# Patient Record
Sex: FEMALE | Race: WHITE
[De-identification: names, ages, dates, MRNs, and addresses within clinical notes are randomized per-mention and may not be internally consistent; named-entity substitution may affect disease eponyms.]

---

## 2017-03-07 NOTE — HISTORY AND PHYSICAL REPORT
March 6, 2017



CHIEF COMPLAINT: I saw Ms. Alexandre in the office on 03/06/17 in regards to her 
abdominal pain. Ms. Alexandre is a 51-year-old female who states gets ongoing right 
subcostal postprandial pain. She does have fatty food intolerance and extreme 
bloating. She underwent both a CTA as well as ultrasound. This did both reveal 
a thickened fundus of the gallbladder with cholelithiasis. They could not rule 
out a gallbladder mass in this area. She does have a history of recurrent 
biliary colic as well. 



PAST MEDICAL HISTORY: Significant for GERD, hypercholesterolemia, hypertension, 
COPD, chronic arthritis. 



PAST SURGICAL HISTORY:  Partial hysterectomy. Laparoscopic tubal ligation. 



MEDICATIONS: She currently takes;

Potassium

Calcium

Probiotic

Imodium

Omeprazole



ALLERGIES: SHE HAS ALLERGIES TO SULFA.



SOCIAL HISTORY:  She does smoke two pack of cigarettes per day. She does have a 
social alcohol history.  



PHYSICAL EXAMINATION:

GENERAL: Height is 5'2". Weight is 111. 

VITAL SIGNS: She is afebrile. Vital signs are stable. 

HEART: Regular.

LUNGS: Decreased.

ABDOMEN: Soft. She does have some tenderness on palpation. She does have a 
reducible umbilical hernia as well. 



IMPRESSION:  CHOLELITHIASIS WITH CHRONIC CHOLECYSTITIS: We did discuss 
laparoscopic cholecystectomy versus observation. She desires surgical 
intervention. The risks include but not limited to; bleeding, infection, ductal 
injury, possible conversion to open, postoperative bile leak, and she 
understands this fully. We will repair her umbilical hernia at the same time. 



Thank you for this kind referral.









______________________________   __________________________________

Ignacio Hope D.O.      Date & Time

JOB NUMBER:  [id number]
MTDD

## 2017-03-20 ENCOUNTER — HOSPITAL ENCOUNTER (OUTPATIENT)
Dept: HOSPITAL 59 - SUR | Age: 52
Discharge: HOME | End: 2017-03-20
Attending: SURGERY
Payer: COMMERCIAL

## 2017-03-20 DIAGNOSIS — E78.00: ICD-10-CM

## 2017-03-20 DIAGNOSIS — K42.9: ICD-10-CM

## 2017-03-20 DIAGNOSIS — J44.9: ICD-10-CM

## 2017-03-20 DIAGNOSIS — K80.10: Primary | ICD-10-CM

## 2017-03-20 DIAGNOSIS — I10: ICD-10-CM

## 2017-03-20 PROCEDURE — 84132 ASSAY OF SERUM POTASSIUM: CPT

## 2017-12-08 ENCOUNTER — HOSPITAL ENCOUNTER (EMERGENCY)
Dept: HOSPITAL 59 - ER | Age: 52
Discharge: STILL ADMITTED OR ADMINISTRATIVE | End: 2017-12-08
Payer: COMMERCIAL

## 2017-12-08 ENCOUNTER — HOSPITAL ENCOUNTER (OUTPATIENT)
Dept: HOSPITAL 59 - SUR | Age: 52
Discharge: HOME | End: 2017-12-08
Attending: ORTHOPAEDIC SURGERY
Payer: COMMERCIAL

## 2017-12-08 DIAGNOSIS — S52.531A: Primary | ICD-10-CM

## 2017-12-08 DIAGNOSIS — J44.9: ICD-10-CM
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
## 2017-12-08 DIAGNOSIS — Z72.0: ICD-10-CM

## 2017-12-08 DIAGNOSIS — I10: ICD-10-CM

## 2017-12-08 DIAGNOSIS — F17.210: ICD-10-CM

## 2017-12-08 PROCEDURE — 01820 ANES CLSD PX RDS U/W/H BONES: CPT

## 2017-12-08 PROCEDURE — 99284 EMERGENCY DEPT VISIT MOD MDM: CPT

## 2017-12-08 PROCEDURE — 76000 FLUOROSCOPY <1 HR PHYS/QHP: CPT

## 2017-12-08 PROCEDURE — 99283 EMERGENCY DEPT VISIT LOW MDM: CPT

## 2017-12-08 PROCEDURE — 25606 PERQ SKEL FIXJ DSTL RDL FX: CPT

## 2017-12-08 NOTE — RADIOLOGY REPORT
EXAM:  WRIST, RIGHT 3 VIEWS



HISTORY:  INJURY.



TECHNIQUE:  Three views of the right wrist were performed.



FINDINGS:  There is a transverse fracture deformity of the distal right radius 
with dorsal angulation of the fracture fragments. The remaining osseous 
structures are intact.



IMPRESSION:  

TRANSVERSE FRACTURE DEFORMITY OF THE DISTAL RIGHT RADIUS. THERE IS DORSAL 
ANGULATION OF THE DISTAL FRACTURE FRAGMENTS. 



JOB NUMBER:  [id number]
MTDD

## 2017-12-08 NOTE — EMERGENCY DEPARTMENT RECORD
History of Present Illness





- General


Chief complaint: Extremity Problem


Stated complaint: FELL, THINK SHE BROKE HER ARM


Time Seen by Provider: 17 00:42


Source: Patient


Mode of Arrival: Ambulatory


Limitations: No limitations





- History of Present Illness


Initial comments: 





pt fell on slippery floor on outstretched hand injuring r wrist.


MD Complaint: Extremity pain, Extremity swelling, Joint pain, Joint swelling


Onset/Timin


-: Hour(s)


Location: Right, Forearm


History of Same: No


Radiation: None


Severity scale (1-10): 3


Quality: Aching


Consistency: Constant


Improves with: Cold therapy


Worsens with: Palpation


Associated Symptoms: Denies other symptoms





- Related Data


 Home Medications











 Medication  Instructions  Recorded  Confirmed  Last Taken


 


Loperamide HCl [Immodium] 2 mg PO NOW 17 Unknown








 Previous Rx's











 Medication  Instructions  Recorded


 


Hydrocodone/Acetaminophen [Norco 1 each PO Q6HR #10 tablet 17





5-325 Tablet]  











 Allergies











Allergy/AdvReac Type Severity Reaction Status Date / Time


 


amoxicillin trihydrate Allergy Severe HIVES Unverified 10/23/17 08:09





[From Augmentin]     


 


potassium clavulanate Allergy Severe HIVES Unverified 10/23/17 08:09





[From Augmentin]     


 


Sulfa (Sulfonamide Allergy Severe HIVES Unverified 10/23/17 08:09





Antibiotics)     














Travel Screening





- Travel/Exposure Within Last 30 Days


Have you traveled within the last 30 days?: No





- Travel/Exposure Within Last Year


Have you traveled outside the [place] in the last year?: No





- Additonal Travel Details


Have you been exposed to anyone with a communicable illness?: No





- Travel Symptoms


Symptom Screening: None





Review of Systems


Reviewed: No additional complaints except as noted below


Constitutional: Reports: As per HPI.  Denies: Chills, Fever, Malaise, Night 

sweats, Weakness, Weight change


Eyes: Reports: As per HPI.  Denies: Eye discharge, Eye pain, Photophobia, 

Vision change


ENT: Reports: As per HPI.  Denies: Congestion, Dental pain, Ear pain, Epistaxis

, Hearing loss, Throat pain


Respiratory: Reports: As per HPI.  Denies: Cough, Dyspnea, Hemoptysis, Stridor, 

Wheezes


Cardiovascular: Reports: As per HPI.  Denies: Arrhythmia, Chest pain, Dyspnea 

on exertion, Edema, Murmurs, Orthopnea, Palpitations, Paroxysmal nocturnal 

dyspnea, Rheumatic Fever, Syncope


Endocrine: Reports: As per HPI.  Denies: Fatigue, Heat or cold intolerance, 

Polydipsia, Polyuria


Gastrointestinal: Reports: As per HPI.  Denies: Abdominal pain, Constipation, 

Diarrhea, Hematemesis, Hematochezia, Melena, Nausea, Vomiting


Genitourinary: Reports: As per HPI.  Denies: Abnormal menses, Discharge, 

Dyspareunia, Dysuria, Frequency, Hematuria, Incontinence, Retention, Urgency


Musculoskeletal: Reports: As per HPI.  Denies: Arthralgia, Back pain, Gout, 

Joint swelling, Myalgia, Neck pain


Skin: Reports: As per HPI.  Denies: Bruising, Change in color, Change in hair/

nails, Lesions, Pruritus, Rash


Neurological: Reports: As per HPI.  Denies: Abnormal gait, Confusion, Headache, 

Numbness, Paresthesias, Seizure, Tingling, Tremors, Vertigo, Weakness


Psychiatric: Reports: As per HPI.  Denies: Anxiety, Auditory hallucinations, 

Depression, Homicidal thoughts, Suicidal thoughts, Visual hallucinations


Hematological/Lymphatic: Reports: As per HPI.  Denies: Anemia, Blood Clots, 

Easy bleeding, Easy bruising, Swollen glands





Past Medical History





- SOCIAL HISTORY


Smoking Status: Current every day smoker


Alcohol Use: Occasional


Drug Use: None





- RESPIRATORY


Hx Respiratory Disorders: No





- CARDIOVASCULAR


Hx Cardio Disorders: Yes


Hx Hypertension: Yes





- NEURO


Hx Neuro Disorders: No





- GI


Hx GI Disorders: No





- 


Hx Genitourinary Disorders: No





- ENDOCRINE


Hx Endocrine Disorders: No





- MUSCULOSKELETAL


Hx Musculoskeletal Disorders: No





- PSYCH


Hx Psych Problems: No





- HEMATOLOGY/ONCOLOGY


Hx Hematology/Oncology Disorders: No





Family Medical History


Any Significant Family History?: No





Physical Exam





- General


General Appearance: Alert, Oriented x3, Cooperative, Mild distress





- Head


Head exam: Normal inspection





- Eye


Eye exam: Normal appearance, PERRL, EOMI


Pupils: Normal accommodation





- ENT


ENT exam: Normal exam, Mucous membranes moist, Normal external ear exam, Normal 

orophraynx


Ear exam: Normal external inspection.  negative: External canal tenderness


Nasal Exam: Normal inspection.  negative: Discharge, Sinus tenderness


Mouth exam: Normal external inspection, Tongue normal


Teeth exam: Normal inspection.  negative: Dental caries


Throat exam: Normal inspection.  negative: Tonsillar erythema, Tonsillar exudate





- Neck


Neck exam: Normal inspection, Full ROM.  negative: Tenderness





- Respiratory


Respiratory exam: Normal lung sounds bilaterally.  negative: Respiratory 

distress





- Cardiovascular


Cardiovascular Exam: Regular rate, Normal rhythm, Normal heart sounds





- GI/Abdominal


GI/Abdominal exam: Soft, Normal bowel sounds.  negative: Tenderness





- Rectal


Rectal exam: Deferred





- 


 exam: Deferred





- Extremities


Extremities exam: Joint swelling, Normal capillary refill, Tenderness.  negative

: Normal inspection, Full ROM


Image of Hand: 


  __________________________














  __________________________





 1 - bony deformity, swelling








- Back


Back exam: Reports: Normal inspection, Full ROM.  Denies: Muscle spasm, Rash 

noted, Tenderness





- Neurological


Neurological exam: Alert, CN II-XII intact, Normal gait, Oriented X3





- Psychiatric


Psychiatric exam: Normal affect, Normal mood





- Skin


Skin exam: Dry, Intact, Normal color, Warm





Disposition


Disposition: Discharge


Clinical Impression: 


Colles' fracture of right radius


Qualifiers:


 Encounter type: initial encounter Fracture type: closed Qualified Code(s): 

S52.531A - Colles' fracture of right radius, initial encounter for closed 

fracture





Disposition: Still a Patient at [facility]


Condition: (1) Good


Instructions:  Wrist Fracture in Adults (ED)


Additional Instructions: 


follow up with dr lovell today.  ice and elevate.  return sooner if worse


Prescriptions: 


Hydrocodone/Acetaminophen [Norco 5-325 Tablet] 1 each PO Q6HR #10 tablet


Forms:  Patient Portal Access





Quality





- Quality Measures


Quality Measures: N/A





- Blood Pressure Screening


Does Patient Have Any of the Following: No


Blood Pressure Classification: Hypertensive Reading


Systolic Measurement: 170


Diastolic Measurement: 103


Screening for High Blood Pressure: < First Hypertensive BP, F/U Documented > [

]


First Hypertensive Follow-up Interventions: Follow-up with rescreen GT 1 day 

and LT 4 weeks.

## 2017-12-08 NOTE — EMERGENCY DEPARTMENT RECORD
History of Present Illness





- General


Chief complaint: Extremity Problem


Stated complaint: FELL, THINK SHE BROKE HER ARM


Time Seen by Provider: 17 00:42


Source: Patient


Mode of Arrival: Ambulatory


Limitations: No limitations





- History of Present Illness


Onset/Timin


-: Hour(s)


Location: Right, Forearm


History of Same: No


Radiation: None


Severity scale (1-10): 3


Quality: Aching


Consistency: Constant


Improves with: Cold therapy


Worsens with: Palpation


Associated Symptoms: Denies other symptoms





- Related Data


 Home Medications











 Medication  Instructions  Recorded  Confirmed  Last Taken


 


Loperamide HCl [Immodium] 2 mg PO NOW 17 Unknown








 Previous Rx's











 Medication  Instructions  Recorded


 


Hydrocodone/Acetaminophen [Norco 1 each PO Q6HR #10 tablet 17





5-325 Tablet]  











 Allergies











Allergy/AdvReac Type Severity Reaction Status Date / Time


 


amoxicillin trihydrate Allergy Severe HIVES Unverified 10/23/17 08:09





[From Augmentin]     


 


potassium clavulanate Allergy Severe HIVES Unverified 10/23/17 08:09





[From Augmentin]     


 


Sulfa (Sulfonamide Allergy Severe HIVES Unverified 10/23/17 08:09





Antibiotics)     














Travel Screening





- Travel/Exposure Within Last 30 Days


Have you traveled within the last 30 days?: No





- Travel/Exposure Within Last Year


Have you traveled outside the [place] in the last year?: No





- Additonal Travel Details


Have you been exposed to anyone with a communicable illness?: No





- Travel Symptoms


Symptom Screening: None





Review of Systems


Constitutional: Reports: As per HPI.  Denies: Chills, Fever, Malaise, Night 

sweats, Weakness, Weight change


Eyes: Reports: As per HPI.  Denies: Eye discharge, Eye pain, Photophobia, 

Vision change


ENT: Reports: As per HPI.  Denies: Congestion, Dental pain, Ear pain, Epistaxis

, Hearing loss, Throat pain


Respiratory: Reports: As per HPI.  Denies: Cough, Dyspnea, Hemoptysis, Stridor, 

Wheezes


Cardiovascular: Reports: As per HPI.  Denies: Arrhythmia, Chest pain, Dyspnea 

on exertion, Edema, Murmurs, Orthopnea, Palpitations, Paroxysmal nocturnal 

dyspnea, Rheumatic Fever, Syncope


Endocrine: Reports: As per HPI.  Denies: Fatigue, Heat or cold intolerance, 

Polydipsia, Polyuria


Gastrointestinal: Reports: As per HPI.  Denies: Abdominal pain, Constipation, 

Diarrhea, Hematemesis, Hematochezia, Melena, Nausea, Vomiting


Genitourinary: Reports: As per HPI.  Denies: Abnormal menses, Discharge, 

Dyspareunia, Dysuria, Frequency, Hematuria, Incontinence, Retention, Urgency


Musculoskeletal: Reports: As per HPI.  Denies: Arthralgia, Back pain, Gout, 

Joint swelling, Myalgia, Neck pain


Skin: Reports: As per HPI.  Denies: Bruising, Change in color, Change in hair/

nails, Lesions, Pruritus, Rash


Neurological: Reports: As per HPI.  Denies: Abnormal gait, Confusion, Headache, 

Numbness, Paresthesias, Seizure, Tingling, Tremors, Vertigo, Weakness


Psychiatric: Reports: As per HPI.  Denies: Anxiety, Auditory hallucinations, 

Depression, Homicidal thoughts, Suicidal thoughts, Visual hallucinations


Hematological/Lymphatic: Reports: As per HPI.  Denies: Anemia, Blood Clots, 

Easy bleeding, Easy bruising, Swollen glands





Past Medical History





- SOCIAL HISTORY


Smoking Status: Current every day smoker


Alcohol Use: Occasional


Drug Use: None





- RESPIRATORY


Hx Respiratory Disorders: No





- CARDIOVASCULAR


Hx Cardio Disorders: Yes


Hx Hypertension: Yes





- NEURO


Hx Neuro Disorders: No





- GI


Hx GI Disorders: No





- 


Hx Genitourinary Disorders: No





- ENDOCRINE


Hx Endocrine Disorders: No





- MUSCULOSKELETAL


Hx Musculoskeletal Disorders: No





- PSYCH


Hx Psych Problems: No





- HEMATOLOGY/ONCOLOGY


Hx Hematology/Oncology Disorders: No





Family Medical History


Any Significant Family History?: No





Physical Exam





- General


Limitations: No limitations





Course





 Vital Signs











  17





  00:35


 


Temperature 97.9 F


 


Pulse Rate [ 88





Pulse Ox Probe] 


 


Respiratory 16





Rate 


 


Blood Pressure 170/103





[Left Arm] 


 


Pulse Ox 97














Disposition


Clinical Impression: 


Colles' fracture of right radius


Qualifiers:


 Encounter type: initial encounter Fracture type: closed Qualified Code(s): 

S52.531A - Colles' fracture of right radius, initial encounter for closed 

fracture





Disposition: Still a Patient at [facility]


Condition: (1) Good


Instructions:  Wrist Fracture in Adults (ED)


Additional Instructions: 


follow up with dr lovell today.  ice and elevate.  return sooner if worse


Prescriptions: 


Hydrocodone/Acetaminophen [Norco 5-325 Tablet] 1 each PO Q6HR #10 tablet


Referrals: 


PETRONA LOVELL [DOCTOR OF OSTEOPATH] - 


[facility] [Provider Group]


Forms:  Patient Portal Access





Quality





- Quality Measures


Quality Measures: N/A





- Blood Pressure Screening


Does Patient Have Any of the Following: No


Blood Pressure Classification: Hypertensive Reading


Systolic Measurement: 170


Diastolic Measurement: 103


Screening for High Blood Pressure: < First Hypertensive BP, F/U Documented > [

]


First Hypertensive Follow-up Interventions: Follow-up with rescreen GT 1 day 

and LT 4 weeks.

## 2017-12-10 ENCOUNTER — HOSPITAL ENCOUNTER (EMERGENCY)
Dept: HOSPITAL 59 - ER | Age: 52
Discharge: HOME | End: 2017-12-10
Payer: COMMERCIAL

## 2017-12-10 DIAGNOSIS — S52.531A: ICD-10-CM

## 2017-12-10 DIAGNOSIS — R22.31: Primary | ICD-10-CM
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
## 2017-12-10 PROCEDURE — 99282 EMERGENCY DEPT VISIT SF MDM: CPT

## 2017-12-10 NOTE — EMERGENCY DEPARTMENT RECORD
History of Present Illness





- General


Chief complaint: Extremity Problem


Stated complaint: SWOLLEN RIGHT ARM FROM INJURY


Time Seen by Provider: 12/10/17 11:29


Source: Patient


Mode of Arrival: Ambulatory


Limitations: No limitations





- History of Present Illness


Initial comments: 


51 yo female presents with some forearm swelling.  On 17 the patient had 

surgery for a wrist fracture.  She states the area just proximal to the splint 

has some swelling.  No upper arm swelling or pain.  The area is not warm or 

red.  The area of swelling has mild bruising.  No finger numbness or tingling.  

She is able to comfortably move her fingers.  The splint itself is not 

uncomfortable.





MD Complaint: Extremity swelling


Onset/Timin


-: Days(s)


Location: Right, Arm


History of Same: No


Improves with: Nothing


Worsens with: Nothing


Associated Symptoms: Denies other symptoms





- Related Data


 Previous Rx's











 Medication  Instructions  Recorded


 


Hydrocodone/Acetaminophen [Norco 1 each PO Q6HR #10 tablet 17





5-325 Tablet]  











 Allergies











Allergy/AdvReac Type Severity Reaction Status Date / Time


 


amoxicillin trihydrate Allergy Severe HIVES Verified 12/10/17 11:31





[From Augmentin]     


 


potassium clavulanate Allergy Severe HIVES Verified 12/10/17 11:31





[From Augmentin]     


 


Sulfa (Sulfonamide Allergy Severe HIVES Verified 12/10/17 11:31





Antibiotics)     














Travel Screening





- Travel/Exposure Within Last 30 Days


Have you traveled within the last 30 days?: No





Review of Systems


Constitutional: Denies: Chills, Fever, Malaise


Eyes: Denies: Eye discharge


ENT: Denies: Congestion, Throat pain


Respiratory: Denies: Cough


Cardiovascular: Denies: Chest pain, Syncope


Endocrine: Denies: Fatigue


Gastrointestinal: Denies: Diarrhea, Nausea, Vomiting


Genitourinary: Denies: Dysuria


Musculoskeletal: Reports: As per HPI, Arthralgia


Skin: Reports: As per HPI, Bruising


Neurological: Denies: Confusion, Headache, Numbness, Paresthesias, Tingling, 

Weakness


Psychiatric: Denies: Anxiety


Hematological/Lymphatic: Denies: Blood Clots, Easy bleeding, Easy bruising, 

Swollen glands





Past Medical History





- SOCIAL HISTORY


Smoking Status: Current every day smoker


Alcohol Use: None


Drug Use: None





- RESPIRATORY


Hx Respiratory Disorders: No





- CARDIOVASCULAR


Hx Cardio Disorders: Yes


Hx Hypertension: Yes





- NEURO


Hx Neuro Disorders: No





- GI


Hx GI Disorders: Yes


Hx Abdominal Pain: Yes


Comment:: diarrhea; chronic





- 


Hx Genitourinary Disorders: No





- ENDOCRINE


Hx Endocrine Disorders: No





- MUSCULOSKELETAL


Hx Musculoskeletal Disorders: No





- PSYCH


Hx Psych Problems: No





- HEMATOLOGY/ONCOLOGY


Hx Hematology/Oncology Disorders: No





Family Medical History


Any Significant Family History?: No





Physical Exam





- General


General Appearance: Alert, Oriented x3, Cooperative, No acute distress


Limitations: No limitations





- Head


Head exam: Atraumatic, Normal inspection





- Eye


Eye exam: Normal appearance





- ENT


ENT exam: Normal exam


Ear exam: Normal external inspection


Nasal Exam: Normal inspection


Mouth exam: Normal external inspection





- Neck


Neck exam: Normal inspection





- Cardiovascular


Cardiovascular Exam: Regular rate, Normal rhythm, Normal heart sounds





- Rectal


Rectal exam: Deferred





- 


 exam: Deferred





- Extremities


Extremities exam: Joint swelling.  negative: Normal inspection


Image of Full Body: 


  __________________________














  __________________________





 1 - mild bruising and swelling just proximal to the splint, the splint is in 

good alignment, not tight, finger easily inserted to ensure not tight proximal 

and distal, moves fingers without pain, sensation intact at baseline








- Neurological


Neurological exam: Alert, Normal gait, Oriented X3.  negative: Altered, Motor 

sensory deficit





- Psychiatric


Psychiatric exam: Normal affect, Normal mood.  negative: Agitated, Anxious





- Skin


Skin exam: Intact.  negative: Cyanosis, Diaphoretic, Erythema





Course





 Vital Signs











  12/10/17





  11:31


 


Temperature 98.3 F


 


Pulse Rate 96 H


 


Respiratory 18





Rate 


 


Blood Pressure 154/105


 


Pulse Ox 97














- Reevaluation(s)


Reevaluation #1: 


The area of concerns appears to be normal swelling and bruising from the 

trauma.  NO upper arm tenderness or swelling to suggest DVT.  The patient has 

no pain or tingling.  The ACE wrapped was removed.  The underlying gauze and 

splint were examined.  No tightness.  At the proximal and distal end there is 

space for movement and a finger to be inserted.  The area does not feel tight 

to the patient.  The ACE was replaced.  We discussed reasons for immediate 

return for a recheck if any concerns.


12/10/17 11:41








Disposition


Disposition: Discharge


Clinical Impression: 


 Colles' fracture of right radius





Disposition: Home, Self-Care


Condition: (1) Good


Instructions:  Splint Care (ED)


Additional Instructions: 


Return for a recheck if the splint feels tight, swelling, or any concerns


Use the sling for comfort


Follow up as scheduled with Dr Dominguez


Time of Disposition: 11:40





Quality





- Quality Measures


Quality Measures: N/A





- Blood Pressure Screening


Does Patient Have Any of the Following: No


Blood Pressure Classification: Hypertensive Reading


Systolic Measurement: 154


Diastolic Measurement: 105


Screening for High Blood Pressure: < Pre-Hypertensive BP, F/U Documented > [

]


Pre-Hypertensive Follow-up Interventions: Referral to alternative/primary care 

provider.

## 2017-12-11 NOTE — OPERATIVE NOTE
DATE OF SURGERY: 12/08/2017



Surgeon: Truman Dominguez DO



PREOPERATIVE DIAGNOSIS: Displaced fracture of the right distal radius. 



POSTOPERATIVE DIAGNOSIS: Displaced fracture of the right distal radius. 



OPERATION: Closed reduction and percutaneous pinning or right distal radius. 



DESCRIPTION OF PROCEDURE: This 52-year-old female was taken to the operating room, placed in the 
supine position on the operating room table. A general anesthetic was administered and the right 
upper extremity was elevated. It was prepped with Hibiclens and draped in the usual sterile fashion. 
The image intensifier was brought into the operative field. Closed reduction of the fracture was 
accomplished without difficulty. The image intensifier was used to confirm satisfactory reduction in 
anterior-posterior and medial-lateral directions. Subsequently, with near anatomic reduction, we 
were able to place 2.054 K wires across the fracture from the radial styloid obliquely at different 
angles across the fracture site. Again, after completion, the image intensifier was used to confirm 
satisfactory maintenance of the position and alignment of the fracture. Once this was accomplished, 
the pins were cut off and bent over. The splint was applied, AP plaster mold was used. The patient 
was taken to the recovery room in satisfactory condition. 



GROSS PATHOLOGY: This patient demonstrated a comminuted displaced fracture of the distal radius 
which was reduced to near anatomic position utilizing closed reduction and percutaneous pinning. 

ELFEGO

## 2018-07-31 ENCOUNTER — HOSPITAL ENCOUNTER (EMERGENCY)
Dept: HOSPITAL 59 - ER | Age: 53
Discharge: HOME | End: 2018-07-31
Payer: COMMERCIAL
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
## 2018-07-31 DIAGNOSIS — S52.691A: Primary | ICD-10-CM

## 2018-07-31 DIAGNOSIS — I10: ICD-10-CM
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
## 2018-07-31 DIAGNOSIS — F17.210: ICD-10-CM

## 2018-07-31 PROCEDURE — 99283 EMERGENCY DEPT VISIT LOW MDM: CPT

## 2018-07-31 NOTE — EMERGENCY DEPARTMENT RECORD
History of Present Illness





- General


Chief complaint: Extremity Problem


Stated complaint: ARM INJURY


Time Seen by Provider: 18 07:18


Source: Patient


Mode of Arrival: Ambulatory


Limitations: No limitations





- History of Present Illness


Initial comments: 





pt fell last night hitting r forearm. it continues to hurt


MD Complaint: Extremity pain


Onset/Timin


-: Hour(s)


Location: Right, Arm


History of Same: No


Severity scale (1-10): 10


Quality: Sharp


Consistency: Constant


Improves with: Immobilization


Worsens with: Exertion, Weight bearing


Associated Symptoms: Denies other symptoms





- Related Data


 Home Medications











 Medication  Instructions  Recorded  Confirmed  Last Taken


 


Potassium Chloride 20 meq PO DAILY 18 Unknown











 Allergies











Allergy/AdvReac Type Severity Reaction Status Date / Time


 


amoxicillin trihydrate Allergy Severe HIVES Unverified 18 09:56





[From Augmentin]     


 


potassium clavulanate Allergy Severe HIVES Unverified 18 09:56





[From Augmentin]     


 


Sulfa (Sulfonamide Allergy Severe HIVES Unverified 18 09:56





Antibiotics)     














Travel Screening





- Travel/Exposure Within Last 30 Days


Have you traveled within the last 30 days?: No





Review of Systems


Reviewed: No additional complaints except as noted below


Constitutional: Reports: As per HPI.  Denies: Chills, Fever, Malaise, Night 

sweats, Weakness, Weight change


Eyes: Reports: As per HPI.  Denies: Eye discharge, Eye pain, Photophobia, 

Vision change


ENT: Reports: As per HPI.  Denies: Congestion, Dental pain, Ear pain, Epistaxis

, Hearing loss, Throat pain


Respiratory: Reports: As per HPI.  Denies: Cough, Dyspnea, Hemoptysis, Stridor, 

Wheezes


Cardiovascular: Reports: As per HPI.  Denies: Arrhythmia, Chest pain, Dyspnea 

on exertion, Edema, Murmurs, Orthopnea, Palpitations, Paroxysmal nocturnal 

dyspnea, Rheumatic Fever, Syncope


Endocrine: Reports: As per HPI.  Denies: Fatigue, Heat or cold intolerance, 

Polydipsia, Polyuria


Gastrointestinal: Reports: As per HPI.  Denies: Abdominal pain, Constipation, 

Diarrhea, Hematemesis, Hematochezia, Melena, Nausea, Vomiting


Genitourinary: Reports: As per HPI.  Denies: Abnormal menses, Discharge, 

Dyspareunia, Dysuria, Frequency, Hematuria, Incontinence, Retention, Urgency


Musculoskeletal: Reports: As per HPI.  Denies: Arthralgia, Back pain, Gout, 

Joint swelling, Myalgia, Neck pain


Skin: Reports: As per HPI.  Denies: Bruising, Change in color, Change in hair/

nails, Lesions, Pruritus, Rash


Neurological: Reports: As per HPI.  Denies: Abnormal gait, Confusion, Headache, 

Numbness, Paresthesias, Seizure, Tingling, Tremors, Vertigo, Weakness


Psychiatric: Reports: As per HPI.  Denies: Anxiety, Auditory hallucinations, 

Depression, Homicidal thoughts, Suicidal thoughts, Visual hallucinations


Hematological/Lymphatic: Reports: As per HPI.  Denies: Anemia, Blood Clots, 

Easy bleeding, Easy bruising, Swollen glands





Past Medical History





- SOCIAL HISTORY


Smoking Status: Current every day smoker





- RESPIRATORY


Hx Respiratory Disorders: No





- CARDIOVASCULAR


Hx Cardio Disorders: Yes


Hx Hypertension: Yes





- NEURO


Hx Neuro Disorders: No





- GI


Hx GI Disorders: Yes


Hx Abdominal Pain: Yes


Comment:: diarrhea; chronic





- 


Hx Genitourinary Disorders: No





- ENDOCRINE


Hx Endocrine Disorders: No





- MUSCULOSKELETAL


Hx Musculoskeletal Disorders: No





- PSYCH


Hx Psych Problems: No





- HEMATOLOGY/ONCOLOGY


Hx Hematology/Oncology Disorders: No





Family Medical History


Any Significant Family History?: No





Physical Exam





- General


General Appearance: Alert, Oriented x3, Cooperative, Mild distress





- Head


Head exam: Normal inspection





- Eye


Eye exam: Normal appearance, PERRL, EOMI


Pupils: Normal accommodation





- ENT


ENT exam: Normal exam, Mucous membranes moist, Normal external ear exam, Normal 

orophraynx


Ear exam: Normal external inspection.  negative: External canal tenderness


Nasal Exam: Normal inspection.  negative: Discharge, Sinus tenderness


Mouth exam: Normal external inspection, Tongue normal


Teeth exam: Normal inspection.  negative: Dental caries


Throat exam: Normal inspection.  negative: Tonsillar erythema, Tonsillar exudate





- Neck


Neck exam: Normal inspection, Full ROM.  negative: Tenderness





- Respiratory


Respiratory exam: Normal lung sounds bilaterally.  negative: Respiratory 

distress





- Cardiovascular


Cardiovascular Exam: Regular rate, Normal rhythm, Normal heart sounds





- GI/Abdominal


GI/Abdominal exam: Soft, Normal bowel sounds.  negative: Tenderness





- Rectal


Rectal exam: Deferred





- 


 exam: Deferred





- Extremities


Extremities exam: Full ROM, Normal capillary refill, Tenderness





- Back


Back exam: Reports: Normal inspection, Full ROM.  Denies: Muscle spasm, Rash 

noted, Tenderness





- Neurological


Neurological exam: Alert, CN II-XII intact, Normal gait, Oriented X3





- Psychiatric


Psychiatric exam: Normal affect, Normal mood





- Skin


Skin exam: Dry, Intact, Normal color, Warm





Course





 Vital Signs











  18





  07:06


 


Temperature 97.6 F


 


Pulse Rate 82


 


Respiratory 18





Rate 


 


Blood Pressure 141/90


 


Pulse Ox 97














Disposition


Disposition: Discharge


Clinical Impression: 


Ulna distal fracture


Qualifiers:


 Encounter type: initial encounter Fracture type: closed Fracture morphology: 

other fracture Laterality: right Qualified Code(s): S52.691A - Other fracture 

of lower end of right ulna, initial encounter for closed fracture





Disposition: Home, Self-Care


Condition: (1) Good


Instructions:  Arm Fracture in Adults (ED)


Additional Instructions: 


follow up with dr lovell.  ice and elevate.  return sooner if worse. motrin 

for pain with food


Referrals: 


[facility] [Provider Group]


PETRONA LOVELL [DOCTOR OF OSTEOPATH] - 


Forms:  Patient Portal Access





Quality





- Quality Measures


Quality Measures: N/A





- Blood Pressure Screening


Does Patient Have Any of the Following: No


Blood Pressure Classification: Hypertensive Reading


Systolic Measurement: 141


Diastolic Measurement: 90


Screening for High Blood Pressure: < First Hypertensive BP, F/U Documented > [

]


First Hypertensive Follow-up Interventions: Follow-up with rescreen GT 1 day 

and LT 4 weeks.

## 2018-08-01 NOTE — RADIOLOGY REPORT
EXAM:  RIGHT FOREARM, AP AND LATERAL VIEWS 



HISTORY:  ARM INJURY.  



TECHNIQUE:  AP and lateral views of the right forearm were obtained.  



Comparison:  Two views of the right wrist dated 2/02/18.  



Encounter:  Initial.



FINDINGS:  There is diffuse osteopenia.  There is redemonstration of a fracture 
deformity of the distal radial metaphysis.  There has been continued healing 
since the prior wrist examination.  This now appears chronic though there is 
persistent anterior cortical step off.  There is an acute oblique fracture of 
the distal shaft of the ulna without significant displacement or angulation.  
There is mild associated soft tissue swelling.  



No other definite acute fracture is seen nor is there dislocation.  There are 
mild degenerative changes of the radial carpal joint.  There is a tiny calcific 
density projecting near the triangular fibrocartilage not significantly 
changed.  This may relate to remote trauma.  



IMPRESSION:  

1.  ACUTE OBLIQUE FRACTURE OF THE DISTAL SHAFT OF THE RIGHT ULNA WITHOUT GROSS 
DISPLACEMENT OR ANGULATION.  



2.  CHRONIC FRACTURE DEFORMITY OF THE DISTAL RADIUS.  



JOB NUMBER:  [id number]
[facility]

## 2018-10-22 ENCOUNTER — HOSPITAL ENCOUNTER (EMERGENCY)
Dept: HOSPITAL 59 - ER | Age: 53
Discharge: HOME | End: 2018-10-22
Payer: COMMERCIAL
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
## 2018-10-22 DIAGNOSIS — I10: ICD-10-CM

## 2018-10-22 DIAGNOSIS — F10.129: Primary | ICD-10-CM
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
## 2018-10-22 DIAGNOSIS — F17.210: ICD-10-CM

## 2018-10-22 PROCEDURE — 99282 EMERGENCY DEPT VISIT SF MDM: CPT

## 2018-10-22 NOTE — EMERGENCY DEPARTMENT RECORD
History of Present Illness





- General


Chief Complaint: General


Time Seen by Provider: 10/22/18 11:18


Source: Patient


Mode of Arrival: Ambulatory


Limitations: No limitations





- History of Present Illness


Initial comments: 


The patient is in the ER due to being found intoxicated at work. She denies any 

pain or injury or trauma.





Onset/Timin


-: Days(s)





- Surinder Coma Scale


Eye Response: (4) Open spontaneously


Motor Response: (5) Localizes to pain


Verbal Response: (5) Oriented


Surinder Total: 14





- Related Data


 Allergies











Allergy/AdvReac Type Severity Reaction Status Date / Time


 


amoxicillin trihydrate Allergy Severe HIVES Verified 10/22/18 10:55





[From Augmentin]     


 


potassium clavulanate Allergy Severe HIVES Verified 10/22/18 10:55





[From Augmentin]     


 


Sulfa (Sulfonamide Allergy Severe HIVES Verified 10/22/18 10:55





Antibiotics)     














Travel Screening





- Travel/Exposure Within Last 30 Days


Have you traveled within the last 30 days?: No





Review of Systems


Constitutional: Denies: Chills, Fever





Past Medical History





- SOCIAL HISTORY


Smoking Status: Current every day smoker


Alcohol Use: Heavy


Alcohol Use Comment: 3 to 4 every evening


Drug Use: None





- RESPIRATORY


Hx Respiratory Disorders: No





- CARDIOVASCULAR


Hx Cardio Disorders: Yes


Hx Hypertension: Yes





- NEURO


Hx Neuro Disorders: No





- GI


Hx GI Disorders: Yes


Hx Abdominal Pain: Yes


Comment:: diarrhea; chronic





- 


Hx Genitourinary Disorders: No





- ENDOCRINE


Hx Endocrine Disorders: No





- MUSCULOSKELETAL


Hx Musculoskeletal Disorders: No





- PSYCH


Hx Psych Problems: No





- HEMATOLOGY/ONCOLOGY


Hx Hematology/Oncology Disorders: No





Family Medical History


Any Significant Family History?: Yes


Hx Heart Disease: Father


Hx Kidney Disease: Father





Physical Exam





- General


General Appearance: Alert, Oriented x3, Cooperative, No acute distress





- Head


Head exam: Atraumatic, Normocephalic, Normal inspection





- Eye


Eye exam: Normal appearance, PERRL





- Neck


Neck exam: Normal inspection, Full ROM.  negative: Tenderness





- Respiratory


Respiratory exam: Normal lung sounds bilaterally.  negative: Respiratory 

distress





- Cardiovascular


Cardiovascular Exam: Regular rate, Normal rhythm, Normal heart sounds





- Extremities


Extremities exam: Normal inspection, Full ROM, Normal capillary refill.  

negative: Tenderness





- Neurological


Neurological exam: Alert, Normal gait, Oriented X3.  negative: Abnormal gait, 

Altered, Motor sensory deficit





Course





 Vital Signs











  10/22/18





  10:59


 


Temperature 98.0 F


 


Pulse Rate 84


 


Respiratory 18





Rate 


 


Blood Pressure 191/112














- Reevaluation(s)


Reevaluation #1: 


The patient will be discharged to Employee health for further evaluation. She 

is not cleared for work due to her blood alcohol being 0.13.


10/22/18 11:19








Disposition


Disposition: Discharge


Clinical Impression: 


 Intoxication





Disposition: Home, Self-Care


Condition: (2) Stable


Instructions:  Alcohol Intoxication (ED)


Additional Instructions: 


Please do not drink alcohol in excess and please see your family doctor for 

further issues. Please also see your family doctor to have your elevated BP 

evaluated further.


Forms:  Patient Portal Access


Time of Disposition: 11:20





Quality





- Quality Measures


Quality Measures: N/A





- Blood Pressure Screening


View Details: Yes


Does Patient Have Any of the Following: Active Dx of HTN


Blood Pressure Classification: Hypertensive Reading


Systolic Measurement: 149


Diastolic Measurement: 102


Screening for High Blood Pressure: Patient Exclusion, Hx of HTN []
